# Patient Record
Sex: MALE | Race: WHITE
[De-identification: names, ages, dates, MRNs, and addresses within clinical notes are randomized per-mention and may not be internally consistent; named-entity substitution may affect disease eponyms.]

---

## 2019-01-14 ENCOUNTER — HOSPITAL ENCOUNTER (OUTPATIENT)
Dept: HOSPITAL 84 - D.HCCARDIO | Age: 79
Discharge: HOME | End: 2019-01-14
Attending: INTERNAL MEDICINE
Payer: MEDICARE

## 2019-01-14 VITALS — BODY MASS INDEX: 30.2 KG/M2

## 2019-01-14 DIAGNOSIS — R53.83: ICD-10-CM

## 2019-01-14 DIAGNOSIS — R06.02: Primary | ICD-10-CM

## 2019-01-21 ENCOUNTER — HOSPITAL ENCOUNTER (OUTPATIENT)
Dept: HOSPITAL 84 - D.CATH | Age: 79
Discharge: HOME | End: 2019-01-21
Attending: INTERNAL MEDICINE
Payer: MEDICARE

## 2019-01-21 VITALS — DIASTOLIC BLOOD PRESSURE: 79 MMHG | SYSTOLIC BLOOD PRESSURE: 128 MMHG

## 2019-01-21 VITALS — BODY MASS INDEX: 30.22 KG/M2 | HEIGHT: 74 IN | WEIGHT: 235.49 LBS

## 2019-01-21 DIAGNOSIS — R94.31: ICD-10-CM

## 2019-01-21 DIAGNOSIS — I20.9: ICD-10-CM

## 2019-01-21 DIAGNOSIS — R07.9: Primary | ICD-10-CM

## 2019-01-21 LAB
ANION GAP SERPL CALC-SCNC: 13.5 MMOL/L (ref 8–16)
BASOPHILS NFR BLD AUTO: 1.2 % (ref 0–2)
BUN SERPL-MCNC: 17 MG/DL (ref 7–18)
CALCIUM SERPL-MCNC: 8.1 MG/DL (ref 8.5–10.1)
CHLORIDE SERPL-SCNC: 106 MMOL/L (ref 98–107)
CO2 SERPL-SCNC: 25.5 MMOL/L (ref 21–32)
CREAT SERPL-MCNC: 1.1 MG/DL (ref 0.6–1.3)
EOSINOPHIL NFR BLD: 1.9 % (ref 0–7)
ERYTHROCYTE [DISTWIDTH] IN BLOOD BY AUTOMATED COUNT: 12.7 % (ref 11.5–14.5)
GLUCOSE SERPL-MCNC: 107 MG/DL (ref 74–106)
HCT VFR BLD CALC: 44.2 % (ref 42–54)
HGB BLD-MCNC: 15.4 G/DL (ref 13.5–17.5)
IMM GRANULOCYTES NFR BLD: 0.3 % (ref 0–5)
LYMPHOCYTES NFR BLD AUTO: 24.3 % (ref 15–50)
MCH RBC QN AUTO: 31.3 PG (ref 26–34)
MCHC RBC AUTO-ENTMCNC: 34.8 G/DL (ref 31–37)
MCV RBC: 89.8 FL (ref 80–100)
MONOCYTES NFR BLD: 10.5 % (ref 2–11)
NEUTROPHILS NFR BLD AUTO: 61.8 % (ref 40–80)
OSMOLALITY SERPL CALC.SUM OF ELEC: 282 MOSM/KG (ref 275–300)
PLATELET # BLD: 193 10X3/UL (ref 130–400)
PMV BLD AUTO: 8.9 FL (ref 7.4–10.4)
POTASSIUM SERPL-SCNC: 4 MMOL/L (ref 3.5–5.1)
RBC # BLD AUTO: 4.92 10X6/UL (ref 4.2–6.1)
SODIUM SERPL-SCNC: 141 MMOL/L (ref 136–145)
WBC # BLD AUTO: 6.5 10X3/UL (ref 4.8–10.8)

## 2019-01-21 NOTE — HEMODYNAMI
PATIENT:OTONIEL MACK                                     MEDICAL RECORD: [id number]
: 42                                            LOCATION:D.CAT          
ACCT# [id number]                                       ADMISSION DATE: 19
 
 
 Generatedon:201913:37
Patient name: OTONIEL MACK Patient #: [id number] Visit #: [id number] SSN: :  Date of
study: 2019
Page: Of
Hemodynamic Procedure Report
****************************
Patient Data
Patient Demographics
Procedure consent was obtained
First Name: OTONIEL             Gender: Male
Last Name: FREDERICK             : 1940
[facility] Initial: VINNY         Age: 78 year(s)
Patient #: [id number]       Race: Unknown
Visit #: [id number]
Accession #:
[id number]
Additional ID: [id number]
Contact details
Address: [address] [place] [address]      Phone: [phone]
[facility]
State: [place]
City: [place]
Zip code: [address]
Past Medical History
Allergies: No known allergies
Admission
Admission Data
Admission Date: 2019   Admission Time: 11:05
Lab Results
Lab Result Date: 2019  Lab Result Time: 11:40
Biochemistry
Name         Units    Result                Min      Max
BUN          mg/dl    17       --(---*)--   7        18
Creatinine   mg/dl    1.1      --(--*-)--   0.6      1.3
CBC
Name         Units    Result                Min      Max
Hematocrit   %        44.2     --(*---)--   42       54
Hemoglobin   g/dl     15.4     --(-*--)--   13.5     17.5
Procedure
Procedure Types
Cath Procedure
Diagnostic Procedure
[facility] w/Coronaries
Sedation Charges
Moderate Sedation up to 15 minutes
Procedure Description
Procedure Date
Procedure Date: 2019
Procedure Start Time: 13:21
Procedure End Time: 13:36
Procedure Staff
Name                            Function
 
Roly Gordon MD                   Performing Physician
Kennedy Bain RT                  Scrub
Lisandro Paredes RN                Nurse
Joseu Max RT                  Monitor
Procedure Data
Cath Procedure
Fluoroscopy
Diagnostic fluoroscopy      Total fluoroscopy Time: 4.3
time: 4.3 min               min
Diagnostic fluoroscopy      Total fluoroscopy dose:
dose: 1175 mGy              1175 mGy
Contrast Material
Contrast Material Type                       Amount (ml)
Isovue 300                                   90
Entry Location
Entry     Primary  Successful  Side  Size  Upsize Upsize Entry    Closure     Polanco
ccessful  Closure
Location                             (Fr)  1 (Fr) 2 (Fr) Remarks  Device        
          Remarks
Radial                         Right 6 Fr                         Mechanical
artery                               Short                        Compression
Estimated blood loss: 5 ml
Diagnostic catheters
Device Type               Used For           End Catheter
Placement
DIAGNOSTIC Mustapha 110cm    Procedure
5Fr catheter (155298)
DIAGNOSTIC Tiger 110cm 5  Procedure
Fr catheter (200942)
DIAGNOSTIC Ohlman 4.5 5Fr  Procedure
catheter (911051)
Procedure Complications
No complications
Procedure Medications
Medication           Administration Route Dosage
Oxygen               etCO2 Nasal cannula  2 l/min
Heparin Flush Bag    added to field       2 bags
(1000units/500ml NS)
0.9% NaCl            I.V.                 100 ml/hr
Lidocaine 2%         added to field       20
Radial Cocktail      added to field       1 syringe
(Verapomil 2mg/Nitro
400mcg/Heparin
1500units)
Fentanyl             I.V.                 50 mcg
Versed               I.V.                 1 mg
Radial Cocktail      I.A.                 1 syringe
(Verapomil 2mg/Nitro
400mcg/Heparin
1500units)
Fentanyl             I.V.                 50 mcg
Versed               I.V.                 1 mg
Hemodynamics
Rest
HGB: 15.4 (g/dl) Heart Rate: 64 (bpm)
Pressure Samples
Time     Site     Value (mmHg) Purpose      Heart      Use
Rate(bpm)
13:26    LV       101/-6,5     Snapshot     77
Gradients
 
Valve  Time  Site Site Mean    SEP/DFP    Peak To Heart  Use
1    2    (mmHg)  (sec/min)  Peak    Rate
(mmHg)  (bpm)
Aortic 13:26 LV   AO                              75
Snapshots
Pre Cath      Intra         NCS           Post Cath
Vital Signs
Time     Heart  Resp   SPO2 etCO2   NIBP (mmHg) Rhythm  Pain    Sedation
Rate   (ipm)  (%)  (mmHg)                      Status  Level
(bpm)
13:07:05 63     16     95   0       122/70(93)  NSR     0 (11)  10(A)
, No
pain
13:11:15 61     16     95   0       118/65(98)  NSR     0 (11)  10(A)
, No
pain
13:15:25 67     17     96   0       114/64(87)  NSR     0 (11)  10(A)
, No
pain
13:19:32 66     17     95   0       128/67(101) NSR     0 (11)  10(A)
, No
pain
13:23:45 64     16     96   11.3    114/71(91)  NSR     0 (11)  9(A)
, No
pain
13:27:54 70     17     96   24.9    111/61(85)  NSR     0 (11)  9(A)
, No
pain
13:32:00 65     16     96   30.2    126/68(92)  NSR     0 (11)  9(A)
, No
pain
13:36:14 67     16     96   26.4    121/62(84)  NSR     0 (11)  9(A)
, No
pain
Medications
Time     Medication       Route   Dose    Verified Delivered Reason       Notes 
 Effectiveness
by       by
13:09:37 Oxygen           etCO2   2 l/min Roly     Lisandro    Per
Nasal           Evan Paredes RN physician
cannula
13:09:45 Heparin Flush    added   2 bags  Roly     Lisandro    used for
Bag              to              Evan Paredes RN procedure
(1000units/500ml field
NS)
13:09:54 0.9% NaCl        I.V.    100     Roly     Lisandro    Per
ml/hr   Evan Paredes RN physician
13:10:02 Lidocaine 2%     added   20ml    Roly     Lisandro    used for
to      vial    Evan Paredes RN procedure
field
13:10:10 Radial Cocktail  added   1       Roly     Lisandro    used for
(Verapomil       to      syringe Evan Paredes RN procedure
2mg/Nitro        field
400mcg/Heparin
1500units)
13:19:27 Fentanyl         I.V.    50 mcg  Roly     Lisandro    for sedation
Evan Paredes RN
13:19:33 Versed           I.V.    1 mg    Roly     Lisandro    for sedation
Evan Paredes RN
13:26:46 Radial Cocktail  I.A.    1       Roly     Roly      for
 
(Verapomil               syringe Evan Gordon MD  vasodilation
2mg/Nitro
400mcg/Heparin
1500units)
13:26:51 Fentanyl         I.V.    50 mcg  Roly     Lisandro    for sedation
Evan Paredes RN
13:26:59 Versed           I.V.    1 mg    Roly     Lisandro    for sedation
Evan Paredes RN
Procedure Log
Time     Note
12:45:04 Lisandro Paredes RN sent for patient. Start room use.
12:54:05 Time tracking: Regular hours (M-F 7:00 - 5:00)
12:54:31 Plan of Care:Hemodynamics will remain stable., Cardiac
rhythm will remain stable., Comfort level will be
maintained., Respiratory function will remain
adequate., Patient/ family verbilizes understanding of
procedure., Procedure tolerated without complication.,
Recovers from procedure without complications..
12:59:32 Patient received from Pre/Post Procedure Room to CCL 2
Alert and oriented. Tansferred to table in Supine
position.
12:59:33 Warm blankets applied, and michael hugger turned on for
patient comfort.
12:59:33 Correct patient and procedure confirmed by team.
12:59:34 Signed procedure consent form obtained from patient.
12:59:34 ECG and BP/O2 sat monitors applied to patient.
13:05:49 Vital chart was started
13:09:37 Oxygen 2 l/min etCO2 Nasal cannula was administered by
Lisandro Paredes RN; Per physician;
13:09:45 Heparin Flush Bag (1000units/500ml NS) 2 bags added to
field was administered by Lisandro Paredes RN; used for
procedure;
13:09:54 0.9% NaCl 100 ml/hr I.V. was administered by Lisandro Paredes RN; Per physician;
13:10:02 Lidocaine 2% 20ml vial added to field was administered
by Lisandro Paredes RN; used for procedure;
13:10:10 Radial Cocktail (Verapomil 2mg/Nitro 400mcg/Heparin
1500units) 1 syringe added to field was administered
by Lisandro Paredes RN; used for procedure;
13:14:09 Baseline sample Acquired.
13:14:12 Rhythm: sinus rhythm
13:14:13 Full Disclosure recording started
13:14:23 H&P Date Dictated: 2019 Within 30 days and on
chart., H&P Addendum completed by physician on day of
procedure. (MUST COMPLETE FOR ALL OUTPATIENTS).
13:14:24 Pre-procedure instructions explained to patient.
13:14:25 Pre-op teaching completed and patient verbalized
understanding.
13:14:26 Family in waiting room.
13:14:28 Patient NPO since Midnight.
13:14:32 Patient allergic to No known allergies
13:14:34 Is the patient allergic to Iodine/contrast media? No.
13:14:37 Is patient on blood thinner?No
13:14:39 Patient diabetic? No.
13:14:42 Previous problem with sedation/anesthesia? No ?
13:14:43 Snore? Yes
13:14:48 Sleep apnea? Yes
13:14:49 Deviated septum? No
13:14:50 Opens mouth fully? Yes
13:14:50 Sticks out tongue? Yes
 
13:14:52 Airway obstruction? No ?
13:14:58 Dentures? No LOST TEETH
13:15:01 Modified Yobany's test Ulnar < 7 seconds
13:15:03 Patient pain scale 0/10 ?.
13:15:07 IV patent on arrival in left forearm with 0.9% NaCl at
[facility].
13:16:11 Lab Result : Creatinine 1.1 mg/dl
13:16:11 Lab Result : BUN 17 mg/dl
13:16:11 Lab Result : Hemoglobin 15.4 g/dl
13:16:11 Lab Result : Hematocrit 44.2 %
13:16:14 Lab results completed and on chart.
13:16:16 Right Radial & Right Groin area was prepped with
chlora-prep and draped in sterile fashion
13:16:17 Alarms reviewed by R. N.
13:16:17 Sharps counted by scrub and verified by R.N.
13:16:20 Use device set Radial Dx or PCI
13:16:21 ACIST Syringe (30623) opened to sterile field.
13:16:21 Medline Cath Pack ([id number]) opened to sterile field.
13:16:21 Bag Decanter (2002S) opened to sterile field.
13:16:22 ACIST Hand Control (70143) opened to sterile field.
13:16:22 ACIST Manifold (31693) opened to sterile field.
13:16:23 Tegaderm 4 x 4 (1626W) opened to sterile field.
13:16:23 MBrace Wrist Support ([id number]) opened to sterile
field.
13:16:24 SHEATH 6FR Slender () opened to sterile field.
13:16:25 DIAGNOSTIC WIRE .035 260cm J wire (792167) opened to
sterile field.
13:16:28 NEEDLE Cook 21G 4cm Radial (S33035) opened to sterile
field.
13:16:35 Physician arrived
13:16:35 --------ALL STOP TIME OUT------
13:16:35 Final Timeout: patient, procedure, and site verified
with staff and physician. All members of the team are
in agreement.
13:16:38 Right Radial & Right Groin site verified by team.
13:16:40 Physical assessment completed. ASA score P 2 - A
patient with mild systemic disease as per Roly Gordon MD.
13:16:44 Sedation plan: IV Moderate Sedation Medication:Versed,
Fentanyl
13:19:17 Zero performed for pressure channel P1
13:19:27 Fentanyl 50 mcg I.V. was administered by Lisandro Paredes
RN; for sedation;
13:19:33 Versed 1 mg I.V. was administered by Lisandro Paredes RN;
for sedation;
13:21:39 Procedure started.
13:21:44 Local anesthetic to right radial artery with Lidocaine
2% by Roly Gordon MD.**INITIAL ACCESS ONLY**
13:22:02 A 6 Fr Short sheath was inserted into the Right Radial
artery
13:23:38 A DIAGNOSTIC Mustapha 110cm 5Fr catheter (691939) was
advanced over the wire and used for Procedure.
13:26:07 LV gram done using HALE
13:26:10 Injector settings: Ml/sec: 5, Volume: 15,
13:26:11 LV hemodynamics recorded.
13:26:25 EF : 40 %
13:26:46 Radial Cocktail (Verapomil 2mg/Nitro 400mcg/Heparin
1500units) 1 syringe I.A. was administered by Roly Gordon MD; for vasodilation;
13:26:51 Fentanyl 50 mcg I.V. was administered by Lisandro Paredes
 
RN; for sedation;
13:26:56 RCA angiography performed.
13:26:59 Versed 1 mg I.V. was administered by Lisandro Paredes RN;
for sedation;
13:28:14 Catheter exchanged over wire.
13:28:18 A DIAGNOSTIC Tiger 110cm 5 Fr catheter ([id number]) was
advanced over the wire and used for Procedure.
13:29:45 LCA angiography performed.
13:31:34 Catheter exchanged over wire.
13:33:45 A DIAGNOSTIC Tiger 4.5 5Fr catheter (023385) was
advanced over the wire and used for Procedure.
13:33:49 LCA angiography performed.
13:33:57 Catheter removed.
13:34:30 TR BAND Large ([id number]) opened to sterile field.
13:34:37 Sheath removed intact; hemostasis achieved with
Mechanical Compression to the Right Radial artery.
13:34:44 Procedure ended.(Physican Out)
13:34:58 Fluoroscopy time 04.30 minutes.
13:35:11 Fluoroscopy dose: 1175 mGy
13:35:11 Flurop Dose total: 1175
13:35:15 Contrast amount:Isovue 300 90ml.
13:35:16 Sharps counted by scrub and verified by R.N.
13:35:18 TR band inflated with 12cc of air.
13:35:19 Insertion/operative site no bleeding no hematoma.
13:35:30 Post-procedure physical assessment completed. ASA
score P 2 - A patient with mild systemic disease as
per Roly Gordon MD.
13:35:32 Post procedure rhythm: unchanged.
13:35:35 Estimated blood loss: 5 ml
13:35:36 Post procedure instruction explained to
patient.Patient verbalizes understanding.
13:35:37 Patient needs reinforcement of post procedure
teaching.
13:36:14 Procedure type changed to Cath procedure, Diagnostic
procedure, LHC, LHC w/Coronaries, Sedation Charges,
Moderate Sedation up to 15 minutes
13:36:39 Procedure and supply charges have been captured,
reviewed, submitted and are correct.
13:36:41 Procedure Complication : No complications
13:36:43 Vital chart was stopped
13:36:43 See physician's report for complete and final results.
13:36:45 Report given to Pre/Post Procedure Room.
13:36:47 Patient transfered to Pre/Post Procedure Room with
Stretcher.
13:36:49 Procedure ended.
13:36:49 Full Disclosure recording stopped
13:36:54 End room use (Document Last)
Device Usage
Item Name   Manufacture  Quantity  Catalog      Hospital Part    Current Minimal
 Lot# /
Number       Charge   Number  Stock   Stock   Serial#
Code
ACIST       Acist        1         53595        664139   067784  [id number]  20
Syringe     Medical
(31447)     Systems Inc
Medline     Medline      1         [id number]    434987   09665   644744  5
Cath Pack
([id number])
Bag         Microtek     1                 770734   52092   019648  5
Decanter    Medical Inc.
 
()
ACIST Hand  Acist        1         87363        250619   194564  613842  5
Control     Medical
(69636)     Systems Inc
ACIST       Acist        1         86358        507961   251071  217983  5
Manifold    Medical
(10596)     Systems Inc
Tegaderm 4  3M           1         1626W        964580   888087  380573  5
x 4 (1626W)
MBrace      Advanced     1         [id number]  433751   07270   903631  5
Wrist       Vascular
Support     Dynamics
([id number])
SHEATH 6FR  Terumo       1         [id number]   708462   672602  734853  5
Slender
()
DIAGNOSTIC  St Raad      1         017505       913198   083962  972804  30
WIRE .035
260cm J
wire
(503414)
NEEDLE [facility] 1         F14409       912988   822669  798692  5
21G 4cm
Radial
(G11508)
DIAGNOSTIC  Terumo       1               381122   303259  935749  5
Mustapha 110cm
5Fr
catheter
(583857)
DIAGNOSTIC  Terumo       1               911230   599284  843446  5
Tiger 110cm
5 Fr
catheter
(836304)
DIAGNOSTIC  Terumo       1               955775   400706  510759  5
Tiger 4.5
5Fr
catheter
(075786)
TR BAND     Terumo       1         [id number]    780247   557712  214795  40
Large
([id number])
Signature Audit [place]
Stage           Time        Signature      Unsigned
Intra-Procedure 2019   Kennedy Bain
1:37:20 PM  RT(R)
Signatures
Monitor : Josue Max RT Signature :
______________________________
Date : ______________ Time :
______________
 
 
 
[place] [address]                           
[place] [address]

## 2019-01-21 NOTE — NUR
RESTING COMFORTABLY WITH NO C/O. RIGHT WRIST TR BAND CDI, NO BLEEDING
OR HEMATOMA NOTED. DENIES ANY NEEDS AT THIS TIME. VSS. WILL CONTINUE
TO MONITOR.

## 2019-01-21 NOTE — NUR
2L NC, NO RESP DISTRESS. RIGHT WRIST TR BAND CDI, NO BLEEDING OR
HEMATOMA NOTED. NO C/O PAIN OR NAUSEA. VSS. FAMILY AT BEDSIDE, CALL
LIGHT WITHIN REACH.

## 2019-01-21 NOTE — NUR
REMAINING AIR REMOVED FROM TR BAND WITH NO BLEEDING NOTED. DRESSING
PLACED TO SITE. DISCHARGE INSTRUCTIONS GIVEN,  VERBALIZED
UNDERSTANDING.